# Patient Record
Sex: MALE | Race: WHITE | Employment: UNEMPLOYED
[De-identification: names, ages, dates, MRNs, and addresses within clinical notes are randomized per-mention and may not be internally consistent; named-entity substitution may affect disease eponyms.]

---

## 2024-06-02 ENCOUNTER — APPOINTMENT (OUTPATIENT)
Facility: HOSPITAL | Age: 59
DRG: 322 | End: 2024-06-02
Payer: COMMERCIAL

## 2024-06-02 ENCOUNTER — HOSPITAL ENCOUNTER (INPATIENT)
Facility: HOSPITAL | Age: 59
LOS: 2 days | Discharge: HOME | DRG: 322 | End: 2024-06-04
Attending: EMERGENCY MEDICINE | Admitting: FAMILY MEDICINE
Payer: COMMERCIAL

## 2024-06-02 DIAGNOSIS — R07.9 CHEST PAIN, UNSPECIFIED TYPE: ICD-10-CM

## 2024-06-02 DIAGNOSIS — I21.4 NSTEMI (NON-ST ELEVATED MYOCARDIAL INFARCTION) (HCC): Primary | ICD-10-CM

## 2024-06-02 DIAGNOSIS — N17.9 ACUTE KIDNEY INJURY (HCC): ICD-10-CM

## 2024-06-02 LAB
ALBUMIN SERPL-MCNC: 4.3 G/DL (ref 3.4–5)
ALBUMIN/GLOB SERPL: 1.4 (ref 0.8–1.7)
ALP SERPL-CCNC: 68 U/L (ref 45–117)
ALT SERPL-CCNC: 70 U/L (ref 16–61)
ANION GAP SERPL CALC-SCNC: 7 MMOL/L (ref 3–18)
APPEARANCE UR: CLEAR
APTT PPP: 20.8 SEC (ref 23–36.4)
AST SERPL-CCNC: 36 U/L (ref 10–38)
BASOPHILS # BLD: 0 K/UL (ref 0–0.1)
BASOPHILS NFR BLD: 0 % (ref 0–2)
BILIRUB SERPL-MCNC: 0.6 MG/DL (ref 0.2–1)
BILIRUB UR QL: NEGATIVE
BUN SERPL-MCNC: 30 MG/DL (ref 7–18)
BUN/CREAT SERPL: 17 (ref 12–20)
CALCIUM SERPL-MCNC: 10.1 MG/DL (ref 8.5–10.1)
CHLORIDE SERPL-SCNC: 104 MMOL/L (ref 100–111)
CO2 SERPL-SCNC: 30 MMOL/L (ref 21–32)
COLOR UR: YELLOW
CREAT SERPL-MCNC: 1.75 MG/DL (ref 0.6–1.3)
DIFFERENTIAL METHOD BLD: ABNORMAL
EOSINOPHIL # BLD: 0 K/UL (ref 0–0.4)
EOSINOPHIL NFR BLD: 0 % (ref 0–5)
ERYTHROCYTE [DISTWIDTH] IN BLOOD BY AUTOMATED COUNT: 13.9 % (ref 11.6–14.5)
GLOBULIN SER CALC-MCNC: 3.1 G/DL (ref 2–4)
GLUCOSE SERPL-MCNC: 99 MG/DL (ref 74–99)
GLUCOSE UR STRIP.AUTO-MCNC: NEGATIVE MG/DL
HCT VFR BLD AUTO: 48.4 % (ref 36–48)
HGB BLD-MCNC: 15.9 G/DL (ref 13–16)
HGB UR QL STRIP: NEGATIVE
IMM GRANULOCYTES # BLD AUTO: 0 K/UL
IMM GRANULOCYTES NFR BLD AUTO: 0 %
KETONES UR QL STRIP.AUTO: ABNORMAL MG/DL
LEUKOCYTE ESTERASE UR QL STRIP.AUTO: NEGATIVE
LYMPHOCYTES # BLD: 2.9 K/UL (ref 0.9–3.6)
LYMPHOCYTES NFR BLD: 16 % (ref 21–52)
MAGNESIUM SERPL-MCNC: 2.3 MG/DL (ref 1.6–2.6)
MCH RBC QN AUTO: 29 PG (ref 24–34)
MCHC RBC AUTO-ENTMCNC: 32.9 G/DL (ref 31–37)
MCV RBC AUTO: 88.3 FL (ref 78–100)
MONOCYTES # BLD: 0.7 K/UL (ref 0.05–1.2)
MONOCYTES NFR BLD: 4 % (ref 3–10)
NEUTS SEG # BLD: 14.8 K/UL (ref 1.8–8)
NEUTS SEG NFR BLD: 80 % (ref 40–73)
NITRITE UR QL STRIP.AUTO: NEGATIVE
NRBC # BLD: 0 K/UL (ref 0–0.01)
NRBC BLD-RTO: 0 PER 100 WBC
PH UR STRIP: 5.5 (ref 5–8)
PLATELET # BLD AUTO: 319 K/UL (ref 135–420)
PLATELET COMMENT: ABNORMAL
PMV BLD AUTO: 10.2 FL (ref 9.2–11.8)
POTASSIUM SERPL-SCNC: 3.5 MMOL/L (ref 3.5–5.5)
PROT SERPL-MCNC: 7.4 G/DL (ref 6.4–8.2)
PROT UR STRIP-MCNC: NEGATIVE MG/DL
RBC # BLD AUTO: 5.48 M/UL (ref 4.35–5.65)
RBC MORPH BLD: ABNORMAL
SODIUM SERPL-SCNC: 141 MMOL/L (ref 136–145)
SP GR UR REFRACTOMETRY: 1.02 (ref 1–1.03)
TROPONIN I SERPL HS-MCNC: 129 NG/L (ref 0–78)
TROPONIN I SERPL HS-MCNC: 482 NG/L (ref 0–78)
UROBILINOGEN UR QL STRIP.AUTO: 0.2 EU/DL (ref 0.2–1)
WBC # BLD AUTO: 18.4 K/UL (ref 4.6–13.2)

## 2024-06-02 PROCEDURE — 1100000000 HC RM PRIVATE

## 2024-06-02 PROCEDURE — 81003 URINALYSIS AUTO W/O SCOPE: CPT

## 2024-06-02 PROCEDURE — 85025 COMPLETE CBC W/AUTO DIFF WBC: CPT

## 2024-06-02 PROCEDURE — 6370000000 HC RX 637 (ALT 250 FOR IP): Performed by: EMERGENCY MEDICINE

## 2024-06-02 PROCEDURE — 6360000002 HC RX W HCPCS: Performed by: EMERGENCY MEDICINE

## 2024-06-02 PROCEDURE — 2580000003 HC RX 258: Performed by: EMERGENCY MEDICINE

## 2024-06-02 PROCEDURE — 84484 ASSAY OF TROPONIN QUANT: CPT

## 2024-06-02 PROCEDURE — 99285 EMERGENCY DEPT VISIT HI MDM: CPT

## 2024-06-02 PROCEDURE — 85730 THROMBOPLASTIN TIME PARTIAL: CPT

## 2024-06-02 PROCEDURE — 83735 ASSAY OF MAGNESIUM: CPT

## 2024-06-02 PROCEDURE — 93005 ELECTROCARDIOGRAM TRACING: CPT

## 2024-06-02 PROCEDURE — 71046 X-RAY EXAM CHEST 2 VIEWS: CPT

## 2024-06-02 PROCEDURE — 80053 COMPREHEN METABOLIC PANEL: CPT

## 2024-06-02 RX ORDER — ROSUVASTATIN CALCIUM 5 MG/1
5 TABLET, COATED ORAL DAILY
Status: ON HOLD | COMMUNITY
Start: 2017-10-27 | End: 2024-06-04 | Stop reason: HOSPADM

## 2024-06-02 RX ORDER — ATORVASTATIN CALCIUM 40 MG/1
40 TABLET, FILM COATED ORAL NIGHTLY
Status: DISCONTINUED | OUTPATIENT
Start: 2024-06-02 | End: 2024-06-02

## 2024-06-02 RX ORDER — TRIAMTERENE AND HYDROCHLOROTHIAZIDE 75; 50 MG/1; MG/1
1 TABLET ORAL DAILY
COMMUNITY
Start: 2017-10-28

## 2024-06-02 RX ORDER — HEPARIN SODIUM 1000 [USP'U]/ML
2000 INJECTION, SOLUTION INTRAVENOUS; SUBCUTANEOUS PRN
Status: DISCONTINUED | OUTPATIENT
Start: 2024-06-02 | End: 2024-06-03

## 2024-06-02 RX ORDER — HEPARIN SODIUM 10000 [USP'U]/100ML
5-30 INJECTION, SOLUTION INTRAVENOUS CONTINUOUS
Status: DISCONTINUED | OUTPATIENT
Start: 2024-06-02 | End: 2024-06-03 | Stop reason: ALTCHOICE

## 2024-06-02 RX ORDER — CETIRIZINE HYDROCHLORIDE 10 MG/1
1 TABLET ORAL DAILY
COMMUNITY

## 2024-06-02 RX ORDER — NITROGLYCERIN 0.4 MG/1
0.4 TABLET SUBLINGUAL EVERY 5 MIN PRN
Status: DISCONTINUED | OUTPATIENT
Start: 2024-06-02 | End: 2024-06-04 | Stop reason: HOSPADM

## 2024-06-02 RX ORDER — ROSUVASTATIN CALCIUM 20 MG/1
20 TABLET, COATED ORAL NIGHTLY
Status: DISCONTINUED | OUTPATIENT
Start: 2024-06-02 | End: 2024-06-04 | Stop reason: HOSPADM

## 2024-06-02 RX ORDER — MAGNESIUM SULFATE IN WATER 40 MG/ML
2000 INJECTION, SOLUTION INTRAVENOUS PRN
Status: DISCONTINUED | OUTPATIENT
Start: 2024-06-02 | End: 2024-06-04 | Stop reason: HOSPADM

## 2024-06-02 RX ORDER — SODIUM CHLORIDE 0.9 % (FLUSH) 0.9 %
5-40 SYRINGE (ML) INJECTION PRN
Status: DISCONTINUED | OUTPATIENT
Start: 2024-06-02 | End: 2024-06-04 | Stop reason: HOSPADM

## 2024-06-02 RX ORDER — CETIRIZINE HYDROCHLORIDE 10 MG/1
10 TABLET ORAL DAILY
Status: DISCONTINUED | OUTPATIENT
Start: 2024-06-03 | End: 2024-06-04 | Stop reason: HOSPADM

## 2024-06-02 RX ORDER — ACETAMINOPHEN 500 MG
1000 TABLET ORAL
Status: COMPLETED | OUTPATIENT
Start: 2024-06-02 | End: 2024-06-02

## 2024-06-02 RX ORDER — IMIPRAMINE HCL 25 MG
25 TABLET ORAL NIGHTLY
Status: ON HOLD | COMMUNITY
Start: 2017-11-01 | End: 2024-06-04 | Stop reason: HOSPADM

## 2024-06-02 RX ORDER — ACETAMINOPHEN 325 MG/1
650 TABLET ORAL EVERY 6 HOURS PRN
Status: DISCONTINUED | OUTPATIENT
Start: 2024-06-02 | End: 2024-06-04 | Stop reason: HOSPADM

## 2024-06-02 RX ORDER — CLOPIDOGREL BISULFATE 75 MG/1
75 TABLET ORAL DAILY
Status: DISCONTINUED | OUTPATIENT
Start: 2024-06-03 | End: 2024-06-03

## 2024-06-02 RX ORDER — POTASSIUM CHLORIDE 20 MEQ/1
40 TABLET, EXTENDED RELEASE ORAL PRN
Status: DISCONTINUED | OUTPATIENT
Start: 2024-06-02 | End: 2024-06-04 | Stop reason: HOSPADM

## 2024-06-02 RX ORDER — POLYETHYLENE GLYCOL 3350 17 G/17G
17 POWDER, FOR SOLUTION ORAL DAILY PRN
Status: DISCONTINUED | OUTPATIENT
Start: 2024-06-02 | End: 2024-06-04 | Stop reason: HOSPADM

## 2024-06-02 RX ORDER — PREDNISONE 10 MG/1
10 TABLET ORAL DAILY
Status: ON HOLD | COMMUNITY
End: 2024-06-04 | Stop reason: HOSPADM

## 2024-06-02 RX ORDER — HEPARIN SODIUM 1000 [USP'U]/ML
4000 INJECTION, SOLUTION INTRAVENOUS; SUBCUTANEOUS PRN
Status: DISCONTINUED | OUTPATIENT
Start: 2024-06-02 | End: 2024-06-03

## 2024-06-02 RX ORDER — ACETAMINOPHEN 650 MG/1
650 SUPPOSITORY RECTAL EVERY 6 HOURS PRN
Status: DISCONTINUED | OUTPATIENT
Start: 2024-06-02 | End: 2024-06-04 | Stop reason: HOSPADM

## 2024-06-02 RX ORDER — 0.9 % SODIUM CHLORIDE 0.9 %
1000 INTRAVENOUS SOLUTION INTRAVENOUS ONCE
Status: COMPLETED | OUTPATIENT
Start: 2024-06-02 | End: 2024-06-02

## 2024-06-02 RX ORDER — ALPRAZOLAM 0.25 MG/1
1 TABLET ORAL 2 TIMES DAILY PRN
COMMUNITY
Start: 2017-11-06

## 2024-06-02 RX ORDER — HEPARIN SODIUM 1000 [USP'U]/ML
4000 INJECTION, SOLUTION INTRAVENOUS; SUBCUTANEOUS ONCE
Status: COMPLETED | OUTPATIENT
Start: 2024-06-02 | End: 2024-06-02

## 2024-06-02 RX ORDER — POTASSIUM CHLORIDE 7.45 MG/ML
10 INJECTION INTRAVENOUS PRN
Status: DISCONTINUED | OUTPATIENT
Start: 2024-06-02 | End: 2024-06-04 | Stop reason: HOSPADM

## 2024-06-02 RX ORDER — SODIUM CHLORIDE 9 MG/ML
INJECTION, SOLUTION INTRAVENOUS CONTINUOUS
Status: DISCONTINUED | OUTPATIENT
Start: 2024-06-03 | End: 2024-06-03

## 2024-06-02 RX ORDER — TRIAMTERENE AND HYDROCHLOROTHIAZIDE 75; 50 MG/1; MG/1
1 TABLET ORAL DAILY
Status: DISCONTINUED | OUTPATIENT
Start: 2024-06-03 | End: 2024-06-04 | Stop reason: HOSPADM

## 2024-06-02 RX ORDER — SILDENAFIL 100 MG/1
100 TABLET, FILM COATED ORAL PRN
COMMUNITY

## 2024-06-02 RX ORDER — SODIUM CHLORIDE 9 MG/ML
INJECTION, SOLUTION INTRAVENOUS PRN
Status: DISCONTINUED | OUTPATIENT
Start: 2024-06-02 | End: 2024-06-04 | Stop reason: HOSPADM

## 2024-06-02 RX ORDER — ASPIRIN 81 MG/1
81 TABLET ORAL DAILY
Status: DISCONTINUED | OUTPATIENT
Start: 2024-06-03 | End: 2024-06-04 | Stop reason: HOSPADM

## 2024-06-02 RX ORDER — SODIUM CHLORIDE 0.9 % (FLUSH) 0.9 %
5-40 SYRINGE (ML) INJECTION EVERY 12 HOURS SCHEDULED
Status: DISCONTINUED | OUTPATIENT
Start: 2024-06-02 | End: 2024-06-04 | Stop reason: HOSPADM

## 2024-06-02 RX ADMIN — HEPARIN SODIUM 12 UNITS/KG/HR: 10000 INJECTION, SOLUTION INTRAVENOUS at 22:48

## 2024-06-02 RX ADMIN — ACETAMINOPHEN 1000 MG: 500 TABLET ORAL at 20:17

## 2024-06-02 RX ADMIN — SODIUM CHLORIDE 1000 ML: 9 INJECTION, SOLUTION INTRAVENOUS at 20:20

## 2024-06-02 RX ADMIN — HEPARIN SODIUM 4000 UNITS: 1000 INJECTION INTRAVENOUS; SUBCUTANEOUS at 22:44

## 2024-06-02 ASSESSMENT — PAIN - FUNCTIONAL ASSESSMENT: PAIN_FUNCTIONAL_ASSESSMENT: NONE - DENIES PAIN

## 2024-06-02 ASSESSMENT — PAIN SCALES - GENERAL: PAINLEVEL_OUTOF10: 1

## 2024-06-02 ASSESSMENT — PAIN DESCRIPTION - LOCATION: LOCATION: CHEST

## 2024-06-02 NOTE — ED TRIAGE NOTES
Pt in ED via EMS for CP. Per EMS, pt was mowing grass and began having  burning chest pain and chest pressure. Pt states he took viagra within last 72 hours. 2 nitro given by ems. 324 ASA given enroute. Pt states the 1st nitro did not help, but the 2nd nitro did. Pt states he is no longer having CP, but still has pressure. Pt also states that he has pain radiating down his left arm but is unsure if the pain is related to this even or a hx of a pinched nerve x 2 weeks.Pt does not appear to be in any distress at this time. Hooked pt up to cardiac, bp, and O2 sat monitor.

## 2024-06-03 ENCOUNTER — APPOINTMENT (OUTPATIENT)
Facility: HOSPITAL | Age: 59
DRG: 322 | End: 2024-06-03
Payer: COMMERCIAL

## 2024-06-03 LAB
ANION GAP SERPL CALC-SCNC: 7 MMOL/L (ref 3–18)
APTT PPP: 57.7 SEC (ref 23–36.4)
BUN SERPL-MCNC: 25 MG/DL (ref 7–18)
BUN/CREAT SERPL: 19 (ref 12–20)
CALCIUM SERPL-MCNC: 9 MG/DL (ref 8.5–10.1)
CHLORIDE SERPL-SCNC: 104 MMOL/L (ref 100–111)
CO2 SERPL-SCNC: 28 MMOL/L (ref 21–32)
CREAT SERPL-MCNC: 1.29 MG/DL (ref 0.6–1.3)
ECHO BSA: 2.15 M2
EKG ATRIAL RATE: 55 BPM
EKG ATRIAL RATE: 64 BPM
EKG DIAGNOSIS: NORMAL
EKG DIAGNOSIS: NORMAL
EKG P AXIS: 22 DEGREES
EKG P AXIS: 54 DEGREES
EKG P-R INTERVAL: 170 MS
EKG P-R INTERVAL: 180 MS
EKG Q-T INTERVAL: 396 MS
EKG Q-T INTERVAL: 452 MS
EKG QRS DURATION: 80 MS
EKG QRS DURATION: 92 MS
EKG QTC CALCULATION (BAZETT): 408 MS
EKG QTC CALCULATION (BAZETT): 432 MS
EKG R AXIS: 13 DEGREES
EKG R AXIS: 21 DEGREES
EKG T AXIS: 18 DEGREES
EKG T AXIS: 45 DEGREES
EKG VENTRICULAR RATE: 55 BPM
EKG VENTRICULAR RATE: 64 BPM
ERYTHROCYTE [DISTWIDTH] IN BLOOD BY AUTOMATED COUNT: 14.2 % (ref 11.6–14.5)
GLUCOSE SERPL-MCNC: 102 MG/DL (ref 74–99)
HCT VFR BLD AUTO: 44.2 % (ref 36–48)
HGB BLD-MCNC: 14.8 G/DL (ref 13–16)
MAGNESIUM SERPL-MCNC: 2.1 MG/DL (ref 1.6–2.6)
MCH RBC QN AUTO: 29.2 PG (ref 24–34)
MCHC RBC AUTO-ENTMCNC: 33.5 G/DL (ref 31–37)
MCV RBC AUTO: 87.4 FL (ref 78–100)
NRBC # BLD: 0 K/UL (ref 0–0.01)
NRBC BLD-RTO: 0 PER 100 WBC
PLATELET # BLD AUTO: 292 K/UL (ref 135–420)
PMV BLD AUTO: 10.3 FL (ref 9.2–11.8)
POTASSIUM SERPL-SCNC: 3 MMOL/L (ref 3.5–5.5)
POTASSIUM SERPL-SCNC: 3.2 MMOL/L (ref 3.5–5.5)
RBC # BLD AUTO: 5.06 M/UL (ref 4.35–5.65)
SODIUM SERPL-SCNC: 139 MMOL/L (ref 136–145)
TROPONIN I SERPL HS-MCNC: 5043 NG/L (ref 0–78)
WBC # BLD AUTO: 12.2 K/UL (ref 4.6–13.2)

## 2024-06-03 PROCEDURE — B2151ZZ FLUOROSCOPY OF LEFT HEART USING LOW OSMOLAR CONTRAST: ICD-10-PCS | Performed by: INTERNAL MEDICINE

## 2024-06-03 PROCEDURE — 6370000000 HC RX 637 (ALT 250 FOR IP)

## 2024-06-03 PROCEDURE — 6360000004 HC RX CONTRAST MEDICATION: Performed by: INTERNAL MEDICINE

## 2024-06-03 PROCEDURE — 6360000002 HC RX W HCPCS: Performed by: EMERGENCY MEDICINE

## 2024-06-03 PROCEDURE — 2140000001 HC CVICU INTERMEDIATE R&B

## 2024-06-03 PROCEDURE — 92928 PRQ TCAT PLMT NTRAC ST 1 LES: CPT | Performed by: INTERNAL MEDICINE

## 2024-06-03 PROCEDURE — C1769 GUIDE WIRE: HCPCS | Performed by: INTERNAL MEDICINE

## 2024-06-03 PROCEDURE — 027034Z DILATION OF CORONARY ARTERY, ONE ARTERY WITH DRUG-ELUTING INTRALUMINAL DEVICE, PERCUTANEOUS APPROACH: ICD-10-PCS | Performed by: INTERNAL MEDICINE

## 2024-06-03 PROCEDURE — 93005 ELECTROCARDIOGRAM TRACING: CPT | Performed by: INTERNAL MEDICINE

## 2024-06-03 PROCEDURE — 99152 MOD SED SAME PHYS/QHP 5/>YRS: CPT | Performed by: INTERNAL MEDICINE

## 2024-06-03 PROCEDURE — 84484 ASSAY OF TROPONIN QUANT: CPT

## 2024-06-03 PROCEDURE — C1874 STENT, COATED/COV W/DEL SYS: HCPCS | Performed by: INTERNAL MEDICINE

## 2024-06-03 PROCEDURE — C1713 ANCHOR/SCREW BN/BN,TIS/BN: HCPCS | Performed by: INTERNAL MEDICINE

## 2024-06-03 PROCEDURE — 93458 L HRT ARTERY/VENTRICLE ANGIO: CPT | Performed by: INTERNAL MEDICINE

## 2024-06-03 PROCEDURE — 4A023N7 MEASUREMENT OF CARDIAC SAMPLING AND PRESSURE, LEFT HEART, PERCUTANEOUS APPROACH: ICD-10-PCS | Performed by: INTERNAL MEDICINE

## 2024-06-03 PROCEDURE — 6370000000 HC RX 637 (ALT 250 FOR IP): Performed by: INTERNAL MEDICINE

## 2024-06-03 PROCEDURE — 36415 COLL VENOUS BLD VENIPUNCTURE: CPT

## 2024-06-03 PROCEDURE — 76000 FLUOROSCOPY <1 HR PHYS/QHP: CPT | Performed by: INTERNAL MEDICINE

## 2024-06-03 PROCEDURE — B2111ZZ FLUOROSCOPY OF MULTIPLE CORONARY ARTERIES USING LOW OSMOLAR CONTRAST: ICD-10-PCS | Performed by: INTERNAL MEDICINE

## 2024-06-03 PROCEDURE — 2580000003 HC RX 258

## 2024-06-03 PROCEDURE — 80048 BASIC METABOLIC PNL TOTAL CA: CPT

## 2024-06-03 PROCEDURE — 99153 MOD SED SAME PHYS/QHP EA: CPT | Performed by: INTERNAL MEDICINE

## 2024-06-03 PROCEDURE — 83735 ASSAY OF MAGNESIUM: CPT

## 2024-06-03 PROCEDURE — 2580000003 HC RX 258: Performed by: INTERNAL MEDICINE

## 2024-06-03 PROCEDURE — 2500000003 HC RX 250 WO HCPCS: Performed by: INTERNAL MEDICINE

## 2024-06-03 PROCEDURE — 2709999900 HC NON-CHARGEABLE SUPPLY: Performed by: INTERNAL MEDICINE

## 2024-06-03 PROCEDURE — 85027 COMPLETE CBC AUTOMATED: CPT

## 2024-06-03 PROCEDURE — 84132 ASSAY OF SERUM POTASSIUM: CPT

## 2024-06-03 PROCEDURE — C1894 INTRO/SHEATH, NON-LASER: HCPCS | Performed by: INTERNAL MEDICINE

## 2024-06-03 PROCEDURE — C1725 CATH, TRANSLUMIN NON-LASER: HCPCS | Performed by: INTERNAL MEDICINE

## 2024-06-03 PROCEDURE — C1887 CATHETER, GUIDING: HCPCS | Performed by: INTERNAL MEDICINE

## 2024-06-03 PROCEDURE — 6360000002 HC RX W HCPCS: Performed by: INTERNAL MEDICINE

## 2024-06-03 PROCEDURE — 99223 1ST HOSP IP/OBS HIGH 75: CPT | Performed by: INTERNAL MEDICINE

## 2024-06-03 PROCEDURE — 85730 THROMBOPLASTIN TIME PARTIAL: CPT

## 2024-06-03 PROCEDURE — 6360000002 HC RX W HCPCS

## 2024-06-03 PROCEDURE — 94761 N-INVAS EAR/PLS OXIMETRY MLT: CPT

## 2024-06-03 PROCEDURE — C9600 PERC DRUG-EL COR STENT SING: HCPCS | Performed by: INTERNAL MEDICINE

## 2024-06-03 DEVICE — STENT ONYXNG40018UX ONYX 4.00X18RX
Type: IMPLANTABLE DEVICE | Status: FUNCTIONAL
Brand: ONYX FRONTIER™

## 2024-06-03 RX ORDER — IODIXANOL 320 MG/ML
INJECTION, SOLUTION INTRAVASCULAR PRN
Status: DISCONTINUED | OUTPATIENT
Start: 2024-06-03 | End: 2024-06-03 | Stop reason: HOSPADM

## 2024-06-03 RX ORDER — VERAPAMIL HYDROCHLORIDE 2.5 MG/ML
INJECTION, SOLUTION INTRAVENOUS PRN
Status: DISCONTINUED | OUTPATIENT
Start: 2024-06-03 | End: 2024-06-03 | Stop reason: HOSPADM

## 2024-06-03 RX ORDER — BIVALIRUDIN 250 MG/5ML
INJECTION, POWDER, LYOPHILIZED, FOR SOLUTION INTRAVENOUS PRN
Status: DISCONTINUED | OUTPATIENT
Start: 2024-06-03 | End: 2024-06-03 | Stop reason: HOSPADM

## 2024-06-03 RX ORDER — ASPIRIN 81 MG/1
81 TABLET, CHEWABLE ORAL ONCE
Status: CANCELLED | OUTPATIENT
Start: 2024-06-03 | End: 2024-06-03

## 2024-06-03 RX ORDER — ASPIRIN 81 MG/1
TABLET, CHEWABLE ORAL
Status: COMPLETED
Start: 2024-06-03 | End: 2024-06-03

## 2024-06-03 RX ORDER — MIDAZOLAM HYDROCHLORIDE 1 MG/ML
INJECTION INTRAMUSCULAR; INTRAVENOUS PRN
Status: DISCONTINUED | OUTPATIENT
Start: 2024-06-03 | End: 2024-06-03 | Stop reason: HOSPADM

## 2024-06-03 RX ORDER — HEPARIN SODIUM 1000 [USP'U]/ML
INJECTION, SOLUTION INTRAVENOUS; SUBCUTANEOUS PRN
Status: DISCONTINUED | OUTPATIENT
Start: 2024-06-03 | End: 2024-06-03 | Stop reason: HOSPADM

## 2024-06-03 RX ORDER — ENOXAPARIN SODIUM 100 MG/ML
40 INJECTION SUBCUTANEOUS DAILY
Status: DISCONTINUED | OUTPATIENT
Start: 2024-06-03 | End: 2024-06-04 | Stop reason: HOSPADM

## 2024-06-03 RX ORDER — FENTANYL CITRATE 50 UG/ML
INJECTION, SOLUTION INTRAMUSCULAR; INTRAVENOUS PRN
Status: DISCONTINUED | OUTPATIENT
Start: 2024-06-03 | End: 2024-06-03 | Stop reason: HOSPADM

## 2024-06-03 RX ORDER — ENOXAPARIN SODIUM 100 MG/ML
40 INJECTION SUBCUTANEOUS DAILY
Status: DISCONTINUED | OUTPATIENT
Start: 2024-06-03 | End: 2024-06-03

## 2024-06-03 RX ADMIN — HEPARIN SODIUM 4000 UNITS: 1000 INJECTION INTRAVENOUS; SUBCUTANEOUS at 07:25

## 2024-06-03 RX ADMIN — SODIUM CHLORIDE: 9 INJECTION, SOLUTION INTRAVENOUS at 01:48

## 2024-06-03 RX ADMIN — SODIUM CHLORIDE 75 ML/HR: 9 INJECTION, SOLUTION INTRAVENOUS at 15:28

## 2024-06-03 RX ADMIN — ASPIRIN 81 MG: 81 TABLET, COATED ORAL at 19:52

## 2024-06-03 RX ADMIN — POTASSIUM CHLORIDE 10 MEQ: 7.46 INJECTION, SOLUTION INTRAVENOUS at 08:29

## 2024-06-03 RX ADMIN — TICAGRELOR 90 MG: 90 TABLET ORAL at 19:45

## 2024-06-03 RX ADMIN — SODIUM CHLORIDE, PRESERVATIVE FREE 10 ML: 5 INJECTION INTRAVENOUS at 01:48

## 2024-06-03 RX ADMIN — POTASSIUM BICARBONATE 40 MEQ: 782 TABLET, EFFERVESCENT ORAL at 19:58

## 2024-06-03 RX ADMIN — ACETAMINOPHEN 650 MG: 325 TABLET ORAL at 19:45

## 2024-06-03 RX ADMIN — ROSUVASTATIN CALCIUM 20 MG: 20 TABLET, COATED ORAL at 19:45

## 2024-06-03 RX ADMIN — METOPROLOL TARTRATE 25 MG: 25 TABLET, FILM COATED ORAL at 19:45

## 2024-06-03 RX ADMIN — ASPIRIN 81 MG CHEWABLE TABLET 81 MG: 81 TABLET CHEWABLE at 09:50

## 2024-06-03 RX ADMIN — SODIUM CHLORIDE, PRESERVATIVE FREE 10 ML: 5 INJECTION INTRAVENOUS at 19:47

## 2024-06-03 RX ADMIN — ENOXAPARIN SODIUM 40 MG: 100 INJECTION SUBCUTANEOUS at 17:27

## 2024-06-03 ASSESSMENT — PAIN SCALES - GENERAL
PAINLEVEL_OUTOF10: 2
PAINLEVEL_OUTOF10: 0

## 2024-06-03 ASSESSMENT — PAIN DESCRIPTION - DESCRIPTORS: DESCRIPTORS: DISCOMFORT

## 2024-06-03 ASSESSMENT — PAIN DESCRIPTION - LOCATION: LOCATION: BACK

## 2024-06-03 ASSESSMENT — PAIN DESCRIPTION - ORIENTATION: ORIENTATION: UPPER

## 2024-06-03 NOTE — PROGRESS NOTES
Removed right wrist band, no bleeding or swelling. Sterile hemostatic dressing applied. Normal radial pulse, normal distal circulation and neuro check. Safety splint applied. Safety instructions reviewed with the patient.   urinary symptoms

## 2024-06-03 NOTE — CONSULTS
[facility] - Consult Note    Cardiology consultation request from ED MD for evaluation and management/treatment of chest pain.     Date of  Admission: 6/2/2024  6:41 PM   Primary Care Physician:  Freddie Sarah DO    Attending Cardiologist: Dr. Diamond        Assessment:     Patient Active Problem List    Diagnosis Date Noted    NSTEMI (non-ST elevated myocardial infarction) (HCC) 06/02/2024    Benign hypertensive heart disease without heart failure     Syncope      - Chest pain: troponin rising this morning with most recent 5,043. EKG without any acute ST changes. Cardiac RFs include HTN, HLD, male, significant family cardiac history to include mother and sister with CAD  - HTN: on triamterene-hctz outpatient   - HLD: on crestor   - LUE pinched nerve: recent tx with prednisone    Primary cardiologist: seen years ago by [facility], no recent follow up. Initial consult CSIDr. Diamond      Plan:     Addendum: Independently seen and evaluated.  Agree with below.  Patient's presenting symptoms are quite suspicious for ACS.  With his increasing troponin, have discussed with him and his wife about proceeding on with coronary angiography and possible revascularization.  All questions were answered.  Will proceed with catheterization today.  Will check echocardiogram.    - Keep patient NPO this morning for coronary angiography  - Continue heparin infusion  - On asa, high potency statin, and BB  - Continuous telemetry monitoring      History of Present Illness:     This is a 59 y.o. male admitted for NSTEMI (non-ST elevated myocardial infarction) (HCC) [I21.4]  Acute kidney injury (HCC) [N17.9].    Patient with a PMHx as stated above who presented to the ED with chest pain. Patient states that he was in the backyard yesterday doing yard work, came inside and took a shower when he suddenly starting having substernal chest pain. He describes the chest pain as pressure. There was no associated nausea,

## 2024-06-03 NOTE — PROGRESS NOTES
TRANSFER - OUT REPORT:    Verbal report given to COREY Jasso on Freddie Wong  being transferred to 2302 for routine progression of patient care       Report consisted of patient's Situation, Background, Assessment and   Recommendations(SBAR).     Information from the following report(s) Nurse Handoff Report was reviewed with the receiving nurse.           Lines:   Peripheral IV 06/02/24 Left Antecubital (Active)   Site Assessment Clean, dry & intact 06/03/24 1010   Line Status Infusing 06/03/24 1010   Line Care Connections checked and tightened 06/03/24 1010   Phlebitis Assessment No symptoms 06/03/24 1010   Infiltration Assessment 0 06/03/24 1010   Alcohol Cap Used Yes 06/03/24 0949   Dressing Status Clean, dry & intact 06/03/24 1010   Dressing Type Transparent 06/03/24 1010       Peripheral IV 06/03/24 Proximal;Right;Anterior Forearm (Active)   Site Assessment Clean, dry & intact 06/03/24 1010   Line Status Brisk blood return;Flushed 06/03/24 1010   Line Care Connections checked and tightened 06/03/24 1010   Phlebitis Assessment No symptoms 06/03/24 1010   Infiltration Assessment 0 06/03/24 1010   Dressing Status Clean, dry & intact 06/03/24 1010   Dressing Type Transparent 06/03/24 1010        Opportunity for questions and clarification was provided.      Patient transported with:  Registered Nurse: COREY Reina

## 2024-06-03 NOTE — PROGRESS NOTES
Occupational Therapy    Orders received, chart reviewed. Pt off unit for coronary angiography and  possible revascularization due to elevated troponin. OT to follow up as schedule allows.   Levi Mo MS, OTR/L

## 2024-06-03 NOTE — H&P
[facility] Family [facility]  Admission History and Physical      Patient:    Freddie Wong      59 y.o. male            MRN:      [id number]                                                                                    Admission Date:         6/2/2024  Code status:               FULL    ASSESSMENT AND PLAN  Freddie Wong is a 59 y.o. year old male with PMH of HTN, HLD, preDM, atopic dermatitis admitted for NSTEMI (non-ST elevated myocardial infarction) (HCC) [I21.4].       NSTEMI  HTN, HLD  -Patient presented with sudden onset left-sided chest pain in setting of normal EKG and rising troponins, consistent with NSTEMI. No evidence of fluid overload on exam concerning for CHF  -No cardiac history though risk factors include HTN, HLD, preDM and FH of MI in sister and mother  -Stress test over 20 years ago was negative  -Cardiology consulted for likely NST vs cardiac cath  -Home medications: triamcinolone 75-HCTZ-50mg daily, rosuvastatin 5mg nightly, previously took losartan but was stopped due to low BP  -EKG NSR (per ED report, EKG not uploaded into chart yet)  -Troponin 129>482  -CBC remarkable for WBC 18.4  -CMP remarkable for Cr 1.75  -UA with trace ketones  -CXR no acute cardiopulm findings  -s/p nitro x2, ASA 324mg, tylenol  -IV heparin started in ED  Plan:  -Admit to med/surg  -Telemetry  -Continue heparin gtt  -DAPT following completion of anticoagulation  -Cardiology on board, appreciate assistance  -Increased home rosuvastatin to 20mg nightly  -Start metoprolol 25mg BID   -Hold home triamterene-HCTZ given MEME  -If recurrent chest pain, repeat EKG and trop  -NPO for NST vs C  -PT/OT  -Nutrition  -CM    MEME  -Cr 1.75 on presentation  -Likely prerenal as patient was outside in the heat doing yardwork today  -BUN/Cr ratio 17 so could be postrenal as well, though no risk factors for obstruction  -s/p 1L NS  Plan:  -Recheck Cr in AM  -Continue IVF  -Daily BMP  -Hold home triamterene-HCTZ   -Avoid nephrotoxic

## 2024-06-03 NOTE — PROGRESS NOTES
[facility] Family [facility]   Post Procedure Check Note      Procedure: Left Heart Cath     Subjective:  Pt is s/p [facility] w/ cardiology service.  On interview patient reports to be doing well.  Denies chest pain shortness of breath or palpitations.  No acute complaints.        Objective:    Vitals:    06/03/24 1505   BP: 110/82   Pulse: 55   Resp: 12   Temp:    SpO2:         PHYSICAL EXAM  Gen: NAD, comfortable,  HEENT: normocephalic, atraumatic,   CV: RRR, S1/S2 present without M/R/G,   Pulm: CTAB, no wheezes, no crackles  Abd: S/NT/ND, no rebound, no guarding  MSK: no clubbing, no edema  Skin: warm, dry, intact, no rash  Neuro: \no focal deficits appreciated   Psych: appropriate, alert,       Assessment/ Plan:  NSTEMI status post [facility] w/stent placement  Coronary angiography demonstrating 85% stenosis of the LAD, status post, stent placed  Plan  - Start DAPT  - Restart metoprolol  - Continue to hold blood pressure meds at this time consider restarting in a.m.  - Heparin gtt dc'd     Rest of plan unless noted here per day team progress note/ other post round notes.        The above patient and plan were discussed with my supervising physician.      Abilio Olivier MD, PGY-1  [facility]  6/3/2024, 3:40 PM

## 2024-06-03 NOTE — CONSULTS
Comprehensive Nutrition Assessment      Type and Reason for Visit:  Initial, Consult    Nutrition Recommendations/Plan:   Diet as ordered  Continue to monitor tolerance of PO, weight, labs, and plan of care during admission.         Malnutrition Assessment:  Malnutrition Status:  Insufficient data (06/03/24 1315)    Context:  Acute Illness     Findings of the 6 clinical characteristics of malnutrition:  Energy Intake:  Unable to assess  Weight Loss:  No significant weight loss     Body Fat Loss:  Unable to assess     Muscle Mass Loss:  Unable to assess    Fluid Accumulation:  Unable to assess     Strength:   (JASPREET)    Nutrition History and Allergies:   PMH of HTN, HLD, preDM, atopic dermatitis. Wt hx (per EMR): 94.3 kg (2/14/23), 93 kg (11/10/23), 95.7 kg (5/15/24), 90.3 kg (admission, stated wt himself the morning of admission, -5.6%) - possible wt loss x 3 weeks. Per pt wife, pt had normal appetite PTA. UBW 205lb. Regard to pt wt loss, his wife shares it was intentional to manage pt pre-diabetic status. Home diet high in vegetables and limited high sat-fat/high sugar foods/drinks. NKFA  Pt  has a past medical history of Erectile dysfunction, Hyperlipidemia, and Hypertension.     Nutrition Assessment:    admitted for NSTEMI. Plan left heart cath 6/3. Seen pt for C/S for nutrition assessment need. Attempted to visit pt, pt off floor for cath placement. Family in room. RD obtained pt food/nutrition- related hx from pt wife. Will continue follow-up on pt per protocols.    Nutrition Related Findings:    Meds reviewed. Pertinent labs: K+ 3.0 (trending down, last replacement 6/3)   Wound Type: None     Last BM (including prior to admit):  (PTA)  Edema: None    Current Nutrition Intake & Therapies:    Average Meal Intake: Unable to assess  Average Supplements Intake: Unable to assess  ADULT DIET; Regular; 4 carb choices (60 gm/meal); Low Fat/Low Chol/High Fiber/2 gm Na     Anthropometric Measures:  Height: 185.4 cm (6'

## 2024-06-03 NOTE — ED PROVIDER NOTES
EMERGENCY DEPARTMENT HISTORY AND PHYSICAL EXAM      Date: 6/2/2024  Patient Name: Freddie Wong      History of Presenting Illness     Chief Complaint   Patient presents with    Chest Pain       Location/Duration/Severity/Modifying factors   Chief Complaint   Patient presents with    Chest Pain       HPI:  Freddie Wong is a 59 y.o. male with PMH significant for hypertension, hyperlipidemia, erectile dysfunction presents with acute onset left-sided chest pressure after he stopped cutting the grass this afternoon.  Has not had this sensation in the past.  Breathing was also somewhat labored.  Pt  denies associated dizziness, lightheadedness, loss of consciousness.  Has been struggling with left-sided shoulder pain rating to the upper arm for the past 2 weeks that was unchanged at the time.  Improving with oral steroid taper. Treatments attempted at home include none at home.  Received aspirin 324 mg as well as 2 nitroglycerin tabs which nearly resolved the pain.  Still having mild pressure to the area.  Denies pain radiating to the left shoulder back, jaw area.  Denies prior history of cardiac disease.  Had a stress test over 20 years ago which was negative.  Denies any prior history of heart catheterizations.  Does have family history of heart disease including his mother and sister.    PCP: Freddie Sarah DO    Current Facility-Administered Medications   Medication Dose Route Frequency Provider Last Rate Last Admin    heparin (porcine) injection 4,000 Units  4,000 Units IntraVENous Once Homer Latham DO        heparin (porcine) injection 4,000 Units  4,000 Units IntraVENous PRN Homer Latham DO        heparin (porcine) injection 2,000 Units  2,000 Units IntraVENous PRN Homer Latham DO        heparin 25,000 units in dextrose 5% 250 mL (premix) infusion  5-30 Units/kg/hr IntraVENous Continuous Homer Latham DO         Current Outpatient Medications   Medication Sig Dispense Refill    ALPRAZolam (XANAX) 0.25

## 2024-06-03 NOTE — PROGRESS NOTES
Bedside and Verbal shift change report given to Catina RN & Casie RN by Elliot NICOLE. Report included the following information Nurse Handoff Report, Intake/Output, MAR, Recent Results, and Cardiac Rhythm sinus rhythm/sinus pradip .

## 2024-06-03 NOTE — PROGRESS NOTES
Received from cath holding via stretcher . Rt wrist no hematoma , no bleeding noted. Right radial pulse + 2. . Patient oriented to room set-up. Instructed to call staff for assistance.

## 2024-06-03 NOTE — PRE SEDATION
Sedation Plan  ASA: class 2 - patient with mild systemic disease     Mallampati class: II - soft palate, uvula, fauces visible.    Sedation plan: moderate (conscious sedation)    Risks, benefits, and alternatives discussed with patient and spouse.

## 2024-06-04 ENCOUNTER — APPOINTMENT (OUTPATIENT)
Facility: HOSPITAL | Age: 59
DRG: 322 | End: 2024-06-04
Payer: COMMERCIAL

## 2024-06-04 VITALS
WEIGHT: 201 LBS | TEMPERATURE: 97.9 F | HEART RATE: 49 BPM | DIASTOLIC BLOOD PRESSURE: 91 MMHG | RESPIRATION RATE: 18 BRPM | OXYGEN SATURATION: 96 % | BODY MASS INDEX: 26.64 KG/M2 | SYSTOLIC BLOOD PRESSURE: 138 MMHG | HEIGHT: 73 IN

## 2024-06-04 PROBLEM — I21.4 NSTEMI (NON-ST ELEVATED MYOCARDIAL INFARCTION) (HCC): Status: RESOLVED | Noted: 2024-06-02 | Resolved: 2024-06-04

## 2024-06-04 LAB
ANION GAP SERPL CALC-SCNC: 9 MMOL/L (ref 3–18)
BUN SERPL-MCNC: 19 MG/DL (ref 7–18)
BUN/CREAT SERPL: 17 (ref 12–20)
CALCIUM SERPL-MCNC: 8.4 MG/DL (ref 8.5–10.1)
CHLORIDE SERPL-SCNC: 106 MMOL/L (ref 100–111)
CO2 SERPL-SCNC: 26 MMOL/L (ref 21–32)
CREAT SERPL-MCNC: 1.12 MG/DL (ref 0.6–1.3)
ECHO AO ASC DIAM: 3.6 CM
ECHO AO ASCENDING AORTA INDEX: 1.67 CM/M2
ECHO AO ROOT DIAM: 3.6 CM
ECHO AO ROOT INDEX: 1.67 CM/M2
ECHO AV AREA PEAK VELOCITY: 2.7 CM2
ECHO AV AREA VTI: 3.3 CM2
ECHO AV AREA/BSA PEAK VELOCITY: 1.3 CM2/M2
ECHO AV AREA/BSA VTI: 1.5 CM2/M2
ECHO AV MEAN GRADIENT: 3 MMHG
ECHO AV MEAN VELOCITY: 0.9 M/S
ECHO AV PEAK GRADIENT: 6 MMHG
ECHO AV PEAK VELOCITY: 1.2 M/S
ECHO AV VELOCITY RATIO: 0.75
ECHO AV VTI: 22.5 CM
ECHO BSA: 2.17 M2
ECHO EST RA PRESSURE: 3 MMHG
ECHO LA DIAMETER INDEX: 1.57 CM/M2
ECHO LA DIAMETER: 3.4 CM
ECHO LA TO AORTIC ROOT RATIO: 0.94
ECHO LA VOL A-L A2C: 36 ML (ref 18–58)
ECHO LA VOL A-L A4C: 28 ML (ref 18–58)
ECHO LA VOL BP: 30 ML (ref 18–58)
ECHO LA VOL MOD A2C: 35 ML (ref 18–58)
ECHO LA VOL MOD A4C: 25 ML (ref 18–58)
ECHO LA VOL/BSA BIPLANE: 14 ML/M2 (ref 16–34)
ECHO LA VOLUME AREA LENGTH: 32 ML
ECHO LA VOLUME INDEX A-L A2C: 17 ML/M2 (ref 16–34)
ECHO LA VOLUME INDEX A-L A4C: 13 ML/M2 (ref 16–34)
ECHO LA VOLUME INDEX AREA LENGTH: 15 ML/M2 (ref 16–34)
ECHO LA VOLUME INDEX MOD A2C: 16 ML/M2 (ref 16–34)
ECHO LA VOLUME INDEX MOD A4C: 12 ML/M2 (ref 16–34)
ECHO LV E' LATERAL VELOCITY: 11 CM/S
ECHO LV E' SEPTAL VELOCITY: 7 CM/S
ECHO LV EDV A2C: 126 ML
ECHO LV EDV A4C: 113 ML
ECHO LV EDV BP: 118 ML (ref 67–155)
ECHO LV EDV INDEX A4C: 52 ML/M2
ECHO LV EDV INDEX BP: 55 ML/M2
ECHO LV EDV NDEX A2C: 58 ML/M2
ECHO LV EJECTION FRACTION A2C: 69 %
ECHO LV EJECTION FRACTION A4C: 55 %
ECHO LV EJECTION FRACTION BIPLANE: 61 % (ref 55–100)
ECHO LV ESV A2C: 38 ML
ECHO LV ESV A4C: 51 ML
ECHO LV ESV BP: 46 ML (ref 22–58)
ECHO LV ESV INDEX A2C: 18 ML/M2
ECHO LV ESV INDEX A4C: 24 ML/M2
ECHO LV ESV INDEX BP: 21 ML/M2
ECHO LV FRACTIONAL SHORTENING: 35 % (ref 28–44)
ECHO LV INTERNAL DIMENSION DIASTOLE INDEX: 2.13 CM/M2
ECHO LV INTERNAL DIMENSION DIASTOLIC: 4.6 CM (ref 4.2–5.9)
ECHO LV INTERNAL DIMENSION SYSTOLIC INDEX: 1.39 CM/M2
ECHO LV INTERNAL DIMENSION SYSTOLIC: 3 CM
ECHO LV IVSD: 1.1 CM (ref 0.6–1)
ECHO LV MASS 2D: 169.9 G (ref 88–224)
ECHO LV MASS INDEX 2D: 78.6 G/M2 (ref 49–115)
ECHO LV POSTERIOR WALL DIASTOLIC: 1 CM (ref 0.6–1)
ECHO LV RELATIVE WALL THICKNESS RATIO: 0.43
ECHO LVOT AREA: 3.8 CM2
ECHO LVOT AV VTI INDEX: 0.87
ECHO LVOT DIAM: 2.2 CM
ECHO LVOT MEAN GRADIENT: 2 MMHG
ECHO LVOT PEAK GRADIENT: 3 MMHG
ECHO LVOT PEAK VELOCITY: 0.9 M/S
ECHO LVOT STROKE VOLUME INDEX: 34.5 ML/M2
ECHO LVOT SV: 74.5 ML
ECHO LVOT VTI: 19.6 CM
ECHO MV A VELOCITY: 0.74 M/S
ECHO MV E DECELERATION TIME (DT): 213.8 MS
ECHO MV E VELOCITY: 0.86 M/S
ECHO MV E/A RATIO: 1.16
ECHO MV E/E' LATERAL: 7.82
ECHO MV E/E' RATIO (AVERAGED): 10.05
ECHO MV E/E' SEPTAL: 12.29
ECHO PV MAX VELOCITY: 0.9 M/S
ECHO PV PEAK GRADIENT: 3 MMHG
ECHO RA AREA 4C: 16.1 CM2
ECHO RV BASAL DIMENSION: 3.3 CM
ECHO RV FREE WALL PEAK S': 14 CM/S
ECHO RV LONGITUDINAL DIMENSION: 5.1 CM
ECHO RV MID DIMENSION: 2.8 CM
ECHO RV TAPSE: 2.3 CM (ref 1.7–?)
ECHO RVOT PEAK GRADIENT: 2 MMHG
ECHO RVOT PEAK VELOCITY: 0.7 M/S
ERYTHROCYTE [DISTWIDTH] IN BLOOD BY AUTOMATED COUNT: 14.3 % (ref 11.6–14.5)
GLUCOSE SERPL-MCNC: 96 MG/DL (ref 74–99)
HCT VFR BLD AUTO: 44 % (ref 36–48)
HGB BLD-MCNC: 14.6 G/DL (ref 13–16)
MAGNESIUM SERPL-MCNC: 2.1 MG/DL (ref 1.6–2.6)
MCH RBC QN AUTO: 29.4 PG (ref 24–34)
MCHC RBC AUTO-ENTMCNC: 33.2 G/DL (ref 31–37)
MCV RBC AUTO: 88.5 FL (ref 78–100)
NRBC # BLD: 0 K/UL (ref 0–0.01)
NRBC BLD-RTO: 0 PER 100 WBC
PLATELET # BLD AUTO: 277 K/UL (ref 135–420)
PMV BLD AUTO: 10.8 FL (ref 9.2–11.8)
POTASSIUM SERPL-SCNC: 3.5 MMOL/L (ref 3.5–5.5)
RBC # BLD AUTO: 4.97 M/UL (ref 4.35–5.65)
SODIUM SERPL-SCNC: 141 MMOL/L (ref 136–145)
WBC # BLD AUTO: 8 K/UL (ref 4.6–13.2)

## 2024-06-04 PROCEDURE — 6370000000 HC RX 637 (ALT 250 FOR IP)

## 2024-06-04 PROCEDURE — 80048 BASIC METABOLIC PNL TOTAL CA: CPT

## 2024-06-04 PROCEDURE — 6370000000 HC RX 637 (ALT 250 FOR IP): Performed by: INTERNAL MEDICINE

## 2024-06-04 PROCEDURE — 94761 N-INVAS EAR/PLS OXIMETRY MLT: CPT

## 2024-06-04 PROCEDURE — 36415 COLL VENOUS BLD VENIPUNCTURE: CPT

## 2024-06-04 PROCEDURE — 83735 ASSAY OF MAGNESIUM: CPT

## 2024-06-04 PROCEDURE — 93306 TTE W/DOPPLER COMPLETE: CPT

## 2024-06-04 PROCEDURE — 2580000003 HC RX 258

## 2024-06-04 PROCEDURE — 85027 COMPLETE CBC AUTOMATED: CPT

## 2024-06-04 PROCEDURE — 99232 SBSQ HOSP IP/OBS MODERATE 35: CPT | Performed by: INTERNAL MEDICINE

## 2024-06-04 RX ORDER — METOPROLOL SUCCINATE 25 MG/1
25 TABLET, EXTENDED RELEASE ORAL DAILY
Qty: 60 TABLET | Refills: 1 | Status: SHIPPED | OUTPATIENT
Start: 2024-06-04

## 2024-06-04 RX ORDER — ROSUVASTATIN CALCIUM 20 MG/1
20 TABLET, COATED ORAL NIGHTLY
Qty: 60 TABLET | Refills: 2 | Status: SHIPPED | OUTPATIENT
Start: 2024-06-04

## 2024-06-04 RX ORDER — ASPIRIN 81 MG/1
81 TABLET ORAL DAILY
Qty: 60 TABLET | Refills: 1 | Status: SHIPPED | OUTPATIENT
Start: 2024-06-05

## 2024-06-04 RX ADMIN — ASPIRIN 81 MG: 81 TABLET, COATED ORAL at 09:50

## 2024-06-04 RX ADMIN — SODIUM CHLORIDE, PRESERVATIVE FREE 10 ML: 5 INJECTION INTRAVENOUS at 09:57

## 2024-06-04 RX ADMIN — TRIAMTERENE AND HYDROCHLOROTHIAZIDE 1 TABLET: 50; 75 TABLET ORAL at 09:50

## 2024-06-04 RX ADMIN — TICAGRELOR 90 MG: 90 TABLET ORAL at 09:50

## 2024-06-04 RX ADMIN — METOPROLOL TARTRATE 25 MG: 25 TABLET, FILM COATED ORAL at 09:50

## 2024-06-04 RX ADMIN — CETIRIZINE HYDROCHLORIDE 10 MG: 10 TABLET, FILM COATED ORAL at 09:50

## 2024-06-04 ASSESSMENT — PAIN SCALES - GENERAL
PAINLEVEL_OUTOF10: 0

## 2024-06-04 NOTE — DISCHARGE SUMMARY
[facility]  DISCHARGE SUMMARY      Name:   Freddie Wong 59 y.o. male  MRN:   [id number]  CSN:   [id number]  Admission Date:  6/2/2024  Discharge Date:  6/4/2024  Attending:             Lucero Machuca MD   PCP:              Freddie Sarah DO   ================================================================  Reason for Admission:  NSTEMI (non-ST elevated myocardial infarction) ([facility]) [I21.4]  Acute kidney injury ([facility]) [N17.9]    Discharge Diagnosis:    NSTEMI s/p LHC W/stent placement  MEME      Follow-up studies/evaluations for PCP/Important Notes to PCP:  Ensure patient has follow-up with cardiology  Ensure blood pressure and heart rate are under control  Pending labs/investigations to follow up as below      NAVNEET Follow Up Appointment:   Follow-up Information       Follow up With Specialties Details Why Contact Info    Ascecnion Garcia, ACNP Nurse Practitioner Schedule an appointment as soon as possible for a visit  [address]  [address]  [place] [address]  [phone]               Readmission prevention plan:   Follow-up with cardiology and take meds as prescribed    GOALS OF CARE (including Code Status, Advanced Care Plan):   full measures    Pending labs/ investigations at discharge to follow up:   None    Operative Procedures:   Left heart catheterization    Consultants:    Cardiology    Condition at discharge: Stable    Disposition at Discharge:  Home    Functional Status at Discharge: Ambulates    Diet: Cardiac diet    Discharge Medications:     Medication List        START taking these medications      aspirin 81 MG EC tablet  Take 1 tablet by mouth daily  Start taking on: June 5, 2024     metoprolol succinate 25 MG extended release tablet  Commonly known as: Toprol XL  Take 1 tablet by mouth daily     ticagrelor 90 MG Tabs tablet  Commonly known as: BRILINTA  Take 1 tablet by mouth 2 times daily            CHANGE how you take these medications      rosuvastatin 20 MG tablet  Commonly

## 2024-06-04 NOTE — PROGRESS NOTES
PT order received. Patient currently mobilizing independently without concerns regarding LE strength or balance. No skilled acute PT needs identified. PT to sign off.     Rhiannon Ervin, PT

## 2024-06-04 NOTE — PROGRESS NOTES
[facility] - Progress Note    Admit Date: 6/2/2024  Attending Cardiologist: Dr. Kohli    Assessment:     Patient Active Problem List    Diagnosis Date Noted    NSTEMI (non-ST elevated myocardial infarction) (HCC) 06/02/2024    Benign hypertensive heart disease without heart failure     Syncope      - NSTEMI: troponin 5,043. EKG on admission without any acute ST changes. Cardiac RFs include HTN, HLD, male, significant family cardiac history to include mother and sister with CAD.   LHC 6/3/24:   LAD proximal/mid segment bifurcation 85% lesion was stented to residual 0% using 4 mm PADMINI, 18 mm length.  - Echo 6/4/24: EF 60-65%, normal wall motion, normal diastolic function   - HTN: on triamterene-hctz outpatient   - HLD: on crestor   - LUE pinched nerve: recent tx with prednisone     Primary [facility]ologist[facility], no recent follow up. Initial consult [facility], Dr. Diamond     Plan:     - Continue DAPT with aspirin and Brilinta for at least 12 months   - Continue high potency statin and discontinue BB   - Patient is stable for discharge home today. Will arrange outpatient follow up in 4 weeks.     Subjective:     No new complaints.     Objective:      Patient Vitals for the past 8 hrs:   Temp Pulse Resp BP SpO2   06/04/24 1020 -- -- -- 137/83 --   06/04/24 0950 -- 70 -- 133/86 --   06/04/24 0806 97.5 °F (36.4 °C) 61 18 137/83 98 %   06/04/24 0400 97.3 °F (36.3 °C) 62 18 137/83 --         Patient Vitals for the past 96 hrs:   Weight   06/04/24 1020 91.2 kg (201 lb)   06/04/24 0545 91.5 kg (201 lb 12.8 oz)   06/03/24 0915 90.3 kg (199 lb)   06/02/24 2158 90.3 kg (199 lb)       TELE: normal sinus rhythm               Current Facility-Administered Medications   Medication Dose Route Frequency    perflutren lipid microspheres (DEFINITY) injection 2 mL  2 mL IntraVENous Once    ticagrelor (BRILINTA) tablet 90 mg  90 mg Oral BID    enoxaparin (LOVENOX) injection 40 mg  40 mg SubCUTAneous

## 2024-06-04 NOTE — PLAN OF CARE
Problem: Pain  Goal: Verbalizes/displays adequate comfort level or baseline comfort level  6/4/2024 1320 by Levi Pope RN  Outcome: Completed  6/4/2024 0907 by Levi Pope RN  Outcome: Progressing  Flowsheets (Taken 6/4/2024 0800)  Verbalizes/displays adequate comfort level or baseline comfort level:   Encourage patient to monitor pain and request assistance   Assess pain using appropriate pain scale     Problem: ABCDS Injury Assessment  Goal: Absence of physical injury  6/4/2024 1320 by Levi Pope RN  Outcome: Completed  6/4/2024 0907 by Levi Pope RN  Outcome: Progressing     Problem: Safety - Adult  Goal: Free from fall injury  6/4/2024 1320 by Levi Pope RN  Outcome: Completed  6/4/2024 0907 by Levi Pope RN  Outcome: Progressing  Flowsheets (Taken 6/4/2024 0722)  Free From Fall Injury: Instruct family/caregiver on patient safety     Problem: Discharge Planning  Goal: Discharge to home or other facility with appropriate resources  6/4/2024 1320 by Levi Pope RN  Outcome: Completed  6/4/2024 0907 by Levi Pope RN  Outcome: Progressing  Flowsheets (Taken 6/4/2024 0800)  Discharge to home or other facility with appropriate resources:   Identify barriers to discharge with patient and caregiver   Arrange for needed discharge resources and transportation as appropriate     
  Problem: Pain  Goal: Verbalizes/displays adequate comfort level or baseline comfort level  Outcome: Progressing  Flowsheets (Taken 6/3/2024 0806 by Diana Mckeon, RN)  Verbalizes/displays adequate comfort level or baseline comfort level:   Encourage patient to monitor pain and request assistance   Assess pain using appropriate pain scale   Implement non-pharmacological measures as appropriate and evaluate response   Consider cultural and social influences on pain and pain management   Notify Licensed Independent Practitioner if interventions unsuccessful or patient reports new pain     Problem: ABCDS Injury Assessment  Goal: Absence of physical injury  Outcome: Progressing  Flowsheets (Taken 6/3/2024 0806 by Diana Mckeon, RN)  Absence of Physical Injury: Implement safety measures based on patient assessment     Problem: Safety - Adult  Goal: Free from fall injury  Outcome: Progressing     Problem: Discharge Planning  Goal: Discharge to home or other facility with appropriate resources  Outcome: Progressing     
GN  Outcome: Progressing

## 2024-06-04 NOTE — PROGRESS NOTES
[facility] Family Medicine  DAILY PROGRESS NOTE      Patient:    Freddie Wong , 59 y.o. male   MRN:  [id number]  Room/Bed:  2302/01  Admission Date:   6/2/2024  Code status:  Full Code    Reason for Admission: NSTEMI  Barriers to Discharge: medically stable for discharge  Anticipated Date of Discharge: 6/4      ASSESSMENT AND PLAN:   Freddie Wong is a 59 y.o. year old male with PMH of HTN, HLD, preDM, atopic dermatitis admitted for NSTEMI s/p C with stent placement      NSTEMI s/p [facility] with stent placement  HTN, HLD  - Patient presented with sudden onset left-sided chest pain in setting of normal EKG and rising troponins, consistent with NSTEMI.   - Trops: 129>482>5043  - LHC on 6/4 demonstrating 85% stenosis of LAD. Stents placed   - s/p heparin gtt dc'd following heart cath  - triamterene HCTZ @ home for HTN. Held on admission due to MEME  Plan:  - continue DAPT  - on metoprolol per cards, 25mg BID  - restart triamterene-HCTZ  -Cardiology on board, appreciate assistance  - continue rosuvastatin to 20mg nightly  -PT/OT signed off   -Nutrition  -CM     MEME, resolved  -Cr 1.75 on presentation  - s/p 1L NS  - held  - MEME now resolved  Plan:  - dc fluids  -Daily BMP  - restart triamterene-HCTZ      PreDM  -Reports recent A1c 6.0  -no medications  Plan:  -Hypoglycemia protocol  -Daily BMP     Atopy  -Home medications: daily zyrtec  -Reports hives of unknown origin, takes zyrtec to prevent  Plan:  -Continue home medications     Pinched nerve of JOANN  -Dr. Sarah prescribed prednisone taper, today was day 6 out of 10  -Symptoms much improved  Plan:  -Ok to stop prednisone as it was a short burst  -Monitor symptoms     Global:  Code: FULL  IVF/Drips: none  I/O / Wt: 90.3 kg  Diet: Cardiac  Bowel Regimen, Last BM: prn miralax  DVT/AC: lovenox  Mobility: per protocol   Disposition: Home     Point of Contact (relationship, number): Mei Wong (wife) [phone]        SUBJECTIVE:   Events of the last 24 hours:  Patient reports to be

## 2024-06-04 NOTE — CARE COORDINATION
06/04/24 1250   Service Assessment   Patient Orientation Alert and Oriented;Person;Place;Situation;Self   Cognition Alert   History Provided By Patient   Primary Caregiver Self   Support Systems Spouse/Significant Other;Children   Patient's Healthcare Decision Maker is:   (Not applicable.)   PCP Verified by CM Yes   Last Visit to PCP Within last 3 months   Prior Functional Level Independent in ADLs/IADLs   Current Functional Level Independent in ADLs/IADLs   Can patient return to prior living arrangement Yes   Ability to make needs known: Good   Family able to assist with home care needs: Yes   Would you like for me to discuss the discharge plan with any other family members/significant others, and if so, who? Yes  (Patient's wife listed in patient contact list.)   Financial Resources None   Community Resources None   CM/SW Referral Other (see comment)  (Discharge planning.)   Social/Functional History   Lives With Spouse;Family   Type of Home House   Home Layout One level   Home Access Stairs to enter with rails   Entrance Stairs - Number of Steps 4 Steps to enter the home.   Entrance Stairs - Rails Both   Bathroom Shower/Tub Walk-in shower   Bathroom Toilet Standard   Bathroom Equipment None   Bathroom Accessibility Accessible   Home Equipment None   Receives Help From Family   ADL Assistance Independent   Homemaking Assistance Independent   Homemaking Responsibilities Yes   Ambulation Assistance Independent   Transfer Assistance Independent   Active  Yes   Mode of Transportation Car;SUV   Education   (Not applicable.)   Occupation Unemployed   Type of Occupation   (Not Applicable.)   Discharge Planning   Type of Residence House   Living Arrangements Spouse/Significant Other;Children   Current Services Prior To Admission None   Potential Assistance Needed N/A   DME Ordered? No   Potential Assistance Purchasing Medications No   Type of Home Care Services None   Patient expects to be discharged to:

## 2024-06-04 NOTE — CARE COORDINATION
D/C order noted for today. Orders reviewed. No needs identified at this time. Patient's wife is at the bedside, and patient's wife will be transporting patient home today for discharge. CM remains available if needed.           Cortney Casiano, [phone]

## 2024-06-04 NOTE — PROGRESS NOTES
conducted an initial consultation and Spiritual Assessment for Freddie Wong, who is a 59 y.o.,male. Patient’s Primary Language is: English.   According to the patient’s EMR Orthodox Affiliation is: None.     The reason the Patient came to the hospital is:   Patient Active Problem List    Diagnosis Date Noted    NSTEMI (non-ST elevated myocardial infarction) (HCC) 06/02/2024    Benign hypertensive heart disease without heart failure     Syncope         The  provided the following Interventions:  Initiated a relationship of care and support.   Explored issues of krystal, belief, spirituality and Rastafarian/ritual needs while hospitalized.  Listened empathically.  Provided chaplaincy education.  Provided information about Spiritual Care Services.  Offered prayer and assurance of continued prayers on patients behalf.   Chart reviewed.    The following outcomes were achieved:  Patient shared limited information about both their medical narrative and spiritual journey/beliefs.  Patient processed feeling about current hospitalization.  Patient expressed gratitude for pastoral care visit.    Assessment:  Patient does not have any Rastafarian/cultural needs that will affect patient’s preferences in health care.  There are no further spiritual or Rastafarian issues which require Spiritual Care Services interventions at this time.       Plan:  Chaplains will continue to follow and will provide pastoral care on an as needed/requested basis    .[email or web address] Gan   Board Certified    Spiritual Care   [phone] conducted an initial consultation and Spiritual Assessment for Freddie Wong, who is a 59 y.o.,male. Patient’s Primary Language is: English.   According to the patient’s EMR Orthodox Affiliation is: None.     The reason the Patient came to the hospital is:   Patient Active Problem List    Diagnosis Date Noted    NSTEMI (non-ST elevated myocardial infarction) (HCC) 06/02/2024    Benign

## 2024-06-04 NOTE — PROGRESS NOTES
Bedside and Verbal shift change report given to Levi RN (oncoming nurse) by Catina RN/Casie RN (offgoing nurse). Report included the following information Nurse Handoff Report, Intake/Output, MAR, Recent Results, and Cardiac Rhythm NSR/Sinus pradip .

## 2024-06-04 NOTE — PROGRESS NOTES
OT order received and chart reviewed.  Spoke with patient in room and patient declined need for skilled OT at this time. Patient is independent with basic self care tasks and functional mobility. No skilled OT needs indicated at this time; will complete the order.          Thank you for the referral,  Ansley Huang MS OTR/L

## 2024-06-04 NOTE — PROGRESS NOTES
Patient and his wife were provided with discharge instructions and education. The patient and his wife verbalized understanding of the discharge information. Time for questions was provided. No questions voiced at this time. The patient was discharged home via wheelchair with his wife.

## 2024-06-04 NOTE — CARE COORDINATION
CM spoke with patient at the bedside.   No CM needs at this time, patient is ambulatory and independent with adl's and iadl's.   Patient said his wife will be transporting patient home today at discharge.       Patient's wife is now at the bedside.               Cortney Casiano RN  Case Management [phone]

## 2024-07-02 ENCOUNTER — TELEPHONE (OUTPATIENT)
Age: 59
End: 2024-07-02

## 2024-07-23 ENCOUNTER — OFFICE VISIT (OUTPATIENT)
Age: 59
End: 2024-07-23
Payer: COMMERCIAL

## 2024-07-23 VITALS
BODY MASS INDEX: 26.51 KG/M2 | HEIGHT: 73 IN | DIASTOLIC BLOOD PRESSURE: 96 MMHG | HEART RATE: 57 BPM | SYSTOLIC BLOOD PRESSURE: 132 MMHG | WEIGHT: 200 LBS | OXYGEN SATURATION: 98 %

## 2024-07-23 DIAGNOSIS — I25.10 CORONARY ARTERY DISEASE: ICD-10-CM

## 2024-07-23 DIAGNOSIS — I21.4 NSTEMI (NON-ST ELEVATED MYOCARDIAL INFARCTION) (HCC): Primary | ICD-10-CM

## 2024-07-23 DIAGNOSIS — R00.1 SINUS BRADYCARDIA: ICD-10-CM

## 2024-07-23 DIAGNOSIS — E78.00 PURE HYPERCHOLESTEROLEMIA: ICD-10-CM

## 2024-07-23 DIAGNOSIS — I11.9 BENIGN HYPERTENSIVE HEART DISEASE WITHOUT HEART FAILURE: ICD-10-CM

## 2024-07-23 PROCEDURE — 99214 OFFICE O/P EST MOD 30 MIN: CPT | Performed by: INTERNAL MEDICINE

## 2024-07-23 PROCEDURE — 93000 ELECTROCARDIOGRAM COMPLETE: CPT | Performed by: INTERNAL MEDICINE

## 2024-07-23 RX ORDER — METOPROLOL SUCCINATE 25 MG/1
25 TABLET, EXTENDED RELEASE ORAL DAILY
Qty: 90 TABLET | Refills: 3 | Status: SHIPPED | OUTPATIENT
Start: 2024-07-23

## 2024-07-23 NOTE — PROGRESS NOTES
HISTORY OF PRESENT ILLNESS  Freddie Wong  59 y.o. male     Chief Complaint   Patient presents with    Follow-Up from Hospital     Hosp f/u 6/2-6/4 due to NSTEMI        ASSESSMENT and PLAN    The primary encounter diagnosis was Benign hypertensive heart disease without heart failure. A diagnosis of Coronary artery disease was also pertinent to this visit.    Mr. Freddie Wong was admitted to [facility] in June 2024 with NSTEMI.  His presenting symptom was profound fatigue, and chest pains after doing yard work.  He described the pain as pressure-like sensation.  He underwent emergent coronary angiography which revealed mild dominant RCA disease as well as left main and circumflex with proximal LAD 85% stenosis.  His EF was noted to be 60-65% on echocardiogram without regional wall motion abnormality.  His LAD was stented to residual 0% using 4 mm PADMINI.  Because of asymptomatic sinus bradycardia, he was not discharged to home on beta-blockers.  From cardiac standpoint, he has noted improvement of his stamina.  He has not had any exertional chest pressure/tightness.  His blood pressure is well-controlled at 132/96.  He will continue on his Maxide.  He has asymptomatic sinus bradycardia 57 bpm.  I have recommended trying Toprol-XL 25 mg daily unless he develops worsening bradycardia, symptomatic.  There is no evidence of decompensated CHF noted.  His weight today is 200 pounds.  This is at baseline.  His target LDL is less than 70.  He will continue on Crestor 20 mg daily.  He denies tobacco use.  He will continue on baby aspirin and Brilinta.  If he cannot tolerate beta-blockers due to bradycardia, fatigue, or dizziness, will discontinue.  This was explained to the and his wife at length.  I will see him back in 6 months.  Will obtain baseline exercise nuclear scan prior to his follow-up visit.    Orders Placed This Encounter   Procedures    EKG 12 Lead     Order Specific Question:   Reason for Exam?

## 2024-07-23 NOTE — PROGRESS NOTES
Freddie Wong presents today for   Chief Complaint   Patient presents with    Follow-Up from Hospital     Hosp f/u 6/2-6/4 due to NSTEMI        Freddie Wong preferred language for health care discussion is english/other.    Is someone accompanying this pt? no    Is the patient using any DME equipment during OV? no    Depression Screening:  Depression: Not at risk (7/23/2024)    PHQ-2     PHQ-2 Score: 0        Learning Assessment:  Who is the primary learner? Patient    What is the preferred language for health care of the primary learner? ENGLISH    How does the primary learner prefer to learn new concepts? DEMONSTRATION    Answered By PATIENT    Relationship to Learner SELF           Pt currently taking Anticoagulant therapy? no    Pt currently taking Antiplatelet therapy ? Aspirin  brilinta      Coordination of Care:  1. Have you been to the ER, urgent care clinic since your last visit? Hospitalized since your last visit? no    2. Have you seen or consulted any other health care providers outside of the [facility] since your last visit? Include any pap smears or colon screening. no  ;

## 2024-08-06 ENCOUNTER — TELEPHONE (OUTPATIENT)
Age: 59
End: 2024-08-06

## 2024-08-06 DIAGNOSIS — R55 SYNCOPE: Primary | ICD-10-CM

## 2024-08-06 NOTE — TELEPHONE ENCOUNTER
----- Message from Petty Marquez MA sent at 8/5/2024  8:51 AM EDT -----  Regarding: FW: Passed Out/Fainting  Contact: [phone]    ----- Message -----  From: Wong Freddie  Sent: 8/5/2024   7:57 AM EDT  To: [facility] Clinical Staff  Subject: Passed Out/Fainting                              Good morning Dr Diamond. I went out early Saturday morning from about 9:00-10:15 am and pulled weeds only. It was hot but for the most part I was in the shade. I stopped when I started to get fairly hot and came in to my garage to take my shoes off, get some water, and call it a day. However when I was trying to do that I passed out or fainted and fell into my car and a trash can. I’m a little bruised and battered. When I woke up I wasn’t sure how I got there but I didn’t feel any pain or anything related to my heart. I do have some bruising but thankfully did not hit my head. So I assumed I may have had one of my vasovegal syncope episodes but not sure. I’m thinking it could have also been blood pressure drop, dehydration, or just overdoing it too much already. I have felt fine over the weekend with only one crazy BP reading of 155/53 last night. But I wanted to let you know in case something needed to be changed or monitored or what to do from here. What do you suggest at this point?

## 2024-08-06 NOTE — TELEPHONE ENCOUNTER
Reviewed with Dr Diamond    Verbal order and read back per Eez Diamond MD  48 holter monitor   Check BMP and mg     Left message on voicemail to call the office back

## 2024-08-07 NOTE — TELEPHONE ENCOUNTER
Spoke with patient and he is aware of instructions and holter monitor ordered to his home address

## 2024-08-08 ENCOUNTER — HOSPITAL ENCOUNTER (OUTPATIENT)
Facility: HOSPITAL | Age: 59
Discharge: HOME | End: 2024-08-08
Payer: COMMERCIAL

## 2024-08-08 DIAGNOSIS — R55 SYNCOPE: ICD-10-CM

## 2024-08-08 LAB
ANION GAP SERPL CALC-SCNC: 5 MMOL/L (ref 3–18)
BUN SERPL-MCNC: 18 MG/DL (ref 7–18)
BUN/CREAT SERPL: 15 (ref 12–20)
CALCIUM SERPL-MCNC: 9.4 MG/DL (ref 8.5–10.1)
CHLORIDE SERPL-SCNC: 102 MMOL/L (ref 100–111)
CO2 SERPL-SCNC: 32 MMOL/L (ref 21–32)
CREAT SERPL-MCNC: 1.19 MG/DL (ref 0.6–1.3)
GLUCOSE SERPL-MCNC: 92 MG/DL (ref 74–99)
MAGNESIUM SERPL-MCNC: 2.1 MG/DL (ref 1.6–2.6)
POTASSIUM SERPL-SCNC: 4 MMOL/L (ref 3.5–5.5)
SODIUM SERPL-SCNC: 139 MMOL/L (ref 136–145)

## 2024-08-08 PROCEDURE — 36415 COLL VENOUS BLD VENIPUNCTURE: CPT

## 2024-08-08 PROCEDURE — 83735 ASSAY OF MAGNESIUM: CPT

## 2024-08-08 PROCEDURE — 80048 BASIC METABOLIC PNL TOTAL CA: CPT

## 2024-08-13 ENCOUNTER — TELEPHONE (OUTPATIENT)
Age: 59
End: 2024-08-13

## 2024-08-13 NOTE — TELEPHONE ENCOUNTER
Fax request received from [facility] for cardiac clearance for routine cleaning and x rays .     Verbal order and read back per Eze Diamond MD  Patient just had stents in June 2024.  Until to stop brilinta and aspirin   And if having any scaling or gum work will have bleeding.    No antibiotics needed prior to cleaning     Faxed form back to provider

## 2024-09-23 DIAGNOSIS — I21.4 NSTEMI (NON-ST ELEVATED MYOCARDIAL INFARCTION) (HCC): ICD-10-CM

## 2024-12-03 ENCOUNTER — TELEPHONE (OUTPATIENT)
Age: 59
End: 2024-12-03

## 2024-12-11 DIAGNOSIS — R55 SYNCOPE: Primary | ICD-10-CM

## 2025-01-30 DIAGNOSIS — R55 SYNCOPE: ICD-10-CM

## 2025-02-05 ENCOUNTER — OFFICE VISIT (OUTPATIENT)
Age: 60
End: 2025-02-05
Payer: COMMERCIAL

## 2025-02-05 VITALS
WEIGHT: 202 LBS | BODY MASS INDEX: 26.77 KG/M2 | HEART RATE: 62 BPM | OXYGEN SATURATION: 98 % | DIASTOLIC BLOOD PRESSURE: 80 MMHG | SYSTOLIC BLOOD PRESSURE: 120 MMHG | HEIGHT: 73 IN

## 2025-02-05 DIAGNOSIS — I21.4 NSTEMI (NON-ST ELEVATED MYOCARDIAL INFARCTION) (HCC): Primary | ICD-10-CM

## 2025-02-05 DIAGNOSIS — I11.9 BENIGN HYPERTENSIVE HEART DISEASE WITHOUT HEART FAILURE: ICD-10-CM

## 2025-02-05 DIAGNOSIS — R55 SYNCOPE AND COLLAPSE: ICD-10-CM

## 2025-02-05 DIAGNOSIS — I25.119 CORONARY ARTERY DISEASE WITH ANGINA PECTORIS, UNSPECIFIED VESSEL OR LESION TYPE, UNSPECIFIED WHETHER NATIVE OR TRANSPLANTED HEART (HCC): ICD-10-CM

## 2025-02-05 PROCEDURE — 99214 OFFICE O/P EST MOD 30 MIN: CPT | Performed by: INTERNAL MEDICINE

## 2025-02-05 PROCEDURE — 93000 ELECTROCARDIOGRAM COMPLETE: CPT | Performed by: INTERNAL MEDICINE

## 2025-02-05 ASSESSMENT — PATIENT HEALTH QUESTIONNAIRE - PHQ9
SUM OF ALL RESPONSES TO PHQ QUESTIONS 1-9: 0
SUM OF ALL RESPONSES TO PHQ QUESTIONS 1-9: 0
1. LITTLE INTEREST OR PLEASURE IN DOING THINGS: NOT AT ALL
SUM OF ALL RESPONSES TO PHQ QUESTIONS 1-9: 0
SUM OF ALL RESPONSES TO PHQ9 QUESTIONS 1 & 2: 0
SUM OF ALL RESPONSES TO PHQ QUESTIONS 1-9: 0
2. FEELING DOWN, DEPRESSED OR HOPELESS: NOT AT ALL

## 2025-02-05 NOTE — PROGRESS NOTES
HISTORY OF PRESENT ILLNESS  Freddie Wong  59 y.o. male     Chief Complaint   Patient presents with    Follow-up     6 months       ASSESSMENT and PLAN    The primary encounter diagnosis was NSTEMI (non-ST elevated myocardial infarction) ([facility]). Diagnoses of Benign hypertensive heart disease without heart failure, Syncope and collapse, and Coronary artery disease with angina pectoris, unspecified vessel or lesion type, unspecified whether native or transplanted heart (HCC) were also pertinent to this visit.    Mr. Freddie Wong was admitted to [facility] in June 2024 with NSTEMI.  His presenting symptom was profound fatigue, and chest pains after doing yard work.  He described the pain as pressure-like sensation.  He underwent emergent coronary angiography which revealed mild dominant RCA disease as well as left main and circumflex with proximal LAD 85% stenosis.  His EF was noted to be 60-65% on echocardiogram without regional wall motion abnormality.  His LAD was stented to residual 0% using 4 mm PADMINI.  Because of asymptomatic sinus bradycardia, he was not discharged to home on beta-blockers.  In December 2024, his nuclear scan showed EF 74% with normal perfusion and wall motion.  His event monitor for occasional episodes of dizziness showed predominantly sinus rhythm with some sinus bradycardia without significant symptoms.  His dizziness is more consistent with vasovagal episodes.    Medication regimen reviewed and current cardiac regimen will be continued.  All new testing since the last office visit was independently reviewed by me.    CAD:    Clinically stable.  HTN:    Well-controlled at 120/80.  Continue current BP regimen.  Rhythm:    Regular sinus rhythm at 62 bpm on low-dose beta-blockers.  CHF:    There is no evidence of decompensated CHF noted.  BMI:     His weight today is 202 pounds.  His baseline weight is 200 pounds.  Hyperlipidemia:   Target LDL <70.   Continue Crestor 20 mg

## 2025-02-07 ENCOUNTER — TELEPHONE (OUTPATIENT)
Age: 60
End: 2025-02-07

## 2025-02-07 NOTE — TELEPHONE ENCOUNTER
----- Message from COREY DAMICO RN sent at 2/7/2025  1:14 PM EST -----    ----- Message -----  From: Eze Diamond MD  Sent: 2/7/2025  12:40 PM EST  To: Kelly Alonso RN    Event monitor shows normal sinus rhythm.  ----- Message -----  From: Kelly Alonso RN  Sent: 1/30/2025  12:22 PM EST  To: Eze Diamond MD    Patient has syncopal episodes and you ordered an event monitor

## 2025-02-10 NOTE — TELEPHONE ENCOUNTER
Spoke with patient Verbal order and read back per Eze Diamond MD  Event monitor shows normal sinus rhythm.     Patient did not have further questions.    In the future if patient have future questions or concerns please give us a call back.

## 2025-06-29 ENCOUNTER — PATIENT MESSAGE (OUTPATIENT)
Age: 60
End: 2025-06-29

## 2025-08-12 ENCOUNTER — OFFICE VISIT (OUTPATIENT)
Age: 60
End: 2025-08-12
Payer: COMMERCIAL

## 2025-08-12 VITALS
HEART RATE: 45 BPM | DIASTOLIC BLOOD PRESSURE: 80 MMHG | OXYGEN SATURATION: 98 % | SYSTOLIC BLOOD PRESSURE: 118 MMHG | WEIGHT: 200 LBS | HEIGHT: 73 IN | BODY MASS INDEX: 26.51 KG/M2

## 2025-08-12 DIAGNOSIS — I21.4 NSTEMI (NON-ST ELEVATED MYOCARDIAL INFARCTION) (HCC): ICD-10-CM

## 2025-08-12 DIAGNOSIS — I11.9 BENIGN HYPERTENSIVE HEART DISEASE WITHOUT HEART FAILURE: ICD-10-CM

## 2025-08-12 DIAGNOSIS — R55 SYNCOPE AND COLLAPSE: ICD-10-CM

## 2025-08-12 DIAGNOSIS — I25.119 CORONARY ARTERY DISEASE WITH ANGINA PECTORIS, UNSPECIFIED VESSEL OR LESION TYPE, UNSPECIFIED WHETHER NATIVE OR TRANSPLANTED HEART: Primary | ICD-10-CM

## 2025-08-12 PROCEDURE — 93000 ELECTROCARDIOGRAM COMPLETE: CPT | Performed by: INTERNAL MEDICINE

## 2025-08-12 PROCEDURE — 99214 OFFICE O/P EST MOD 30 MIN: CPT | Performed by: INTERNAL MEDICINE

## 2025-08-12 RX ORDER — METOPROLOL SUCCINATE 25 MG/1
25 TABLET, EXTENDED RELEASE ORAL DAILY
Qty: 90 TABLET | Refills: 3 | Status: SHIPPED | OUTPATIENT
Start: 2025-08-12

## 2025-08-12 ASSESSMENT — PATIENT HEALTH QUESTIONNAIRE - PHQ9
SUM OF ALL RESPONSES TO PHQ QUESTIONS 1-9: 0
2. FEELING DOWN, DEPRESSED OR HOPELESS: NOT AT ALL
SUM OF ALL RESPONSES TO PHQ QUESTIONS 1-9: 0
1. LITTLE INTEREST OR PLEASURE IN DOING THINGS: NOT AT ALL
SUM OF ALL RESPONSES TO PHQ QUESTIONS 1-9: 0
SUM OF ALL RESPONSES TO PHQ QUESTIONS 1-9: 0

## (undated) DEVICE — PRESSURE MONITORING SET: Brand: TRUWAVE

## (undated) DEVICE — VASCULAR CATH-PACK

## (undated) DEVICE — CATHETER GUID 6FR L100CM DIA0.071IN NYL SHFT EBU3.5

## (undated) DEVICE — PROCEDURE KIT FLUID MGMT 10 FR CUST MAINFOLD

## (undated) DEVICE — Device

## (undated) DEVICE — HI-TORQUE BALANCE GUIDE WIRE W/HYDROCOAT .014 STRAIGHT TIP 3.0 CM X 190 CM: Brand: HI-TORQUE BALANCE

## (undated) DEVICE — WIRE .035 EXCHANGE J 3MM 260CM

## (undated) DEVICE — COPILOT BLEEDBACK CONTROL VALVE: Brand: COPILOT

## (undated) DEVICE — DRAPE,ANGIO,BRACH,STERILE,38X44: Brand: MEDLINE

## (undated) DEVICE — PLUS 30 PRIORITY PACK ACCESSORY KIT INDEFLATOR PLUS 30 INFLATION DEVICE 30 ATM 10 CC / ROTATING HEMOSTATIC VALVE .115 " / GUIDE WIRE INTRODUCER / TORQUE DEVICE: Brand: INDEFLATOR

## (undated) DEVICE — CATH BLLN ANGIO 3.50X15MM SC EUPHORA RX

## (undated) DEVICE — GLIDESHEATH SLENDER STAINLESS STEEL KIT: Brand: GLIDESHEATH SLENDER

## (undated) DEVICE — RADIFOCUS OPTITORQUE ANGIOGRAPHIC CATHETER: Brand: OPTITORQUE

## (undated) DEVICE — SET FLD ADMIN 3 W STPCOCK FIX FEM L BOR 1IN

## (undated) DEVICE — AGENT HEMOSTATIC DRY TOP D-STAT